# Patient Record
Sex: FEMALE | Race: OTHER | HISPANIC OR LATINO | RURAL
[De-identification: names, ages, dates, MRNs, and addresses within clinical notes are randomized per-mention and may not be internally consistent; named-entity substitution may affect disease eponyms.]

---

## 2024-03-18 ENCOUNTER — OFFICE VISIT (OUTPATIENT)
Dept: FAMILY MEDICINE | Facility: CLINIC | Age: 44
End: 2024-03-18

## 2024-03-18 VITALS
BODY MASS INDEX: 26.81 KG/M2 | SYSTOLIC BLOOD PRESSURE: 124 MMHG | TEMPERATURE: 99 F | WEIGHT: 142 LBS | HEIGHT: 61 IN | OXYGEN SATURATION: 100 % | DIASTOLIC BLOOD PRESSURE: 76 MMHG

## 2024-03-18 DIAGNOSIS — M25.511 ACUTE PAIN OF RIGHT SHOULDER: Primary | ICD-10-CM

## 2024-03-18 PROCEDURE — 96372 THER/PROPH/DIAG INJ SC/IM: CPT | Mod: ,,, | Performed by: NURSE PRACTITIONER

## 2024-03-18 PROCEDURE — 99213 OFFICE O/P EST LOW 20 MIN: CPT | Mod: 25,,, | Performed by: NURSE PRACTITIONER

## 2024-03-18 RX ORDER — KETOROLAC TROMETHAMINE 30 MG/ML
30 INJECTION, SOLUTION INTRAMUSCULAR; INTRAVENOUS
Status: COMPLETED | OUTPATIENT
Start: 2024-03-18 | End: 2024-03-18

## 2024-03-18 RX ORDER — DEXAMETHASONE SODIUM PHOSPHATE 4 MG/ML
4 INJECTION, SOLUTION INTRA-ARTICULAR; INTRALESIONAL; INTRAMUSCULAR; INTRAVENOUS; SOFT TISSUE
Status: COMPLETED | OUTPATIENT
Start: 2024-03-18 | End: 2024-03-18

## 2024-03-18 RX ADMIN — DEXAMETHASONE SODIUM PHOSPHATE 4 MG: 4 INJECTION, SOLUTION INTRA-ARTICULAR; INTRALESIONAL; INTRAMUSCULAR; INTRAVENOUS; SOFT TISSUE at 02:03

## 2024-03-18 RX ADMIN — KETOROLAC TROMETHAMINE 30 MG: 30 INJECTION, SOLUTION INTRAMUSCULAR; INTRAVENOUS at 02:03

## 2024-03-18 NOTE — PROGRESS NOTES
"Subjective:       Patient ID: Carrol Javed is a 43 y.o. female.    Chief Complaint: Shoulder Pain (Pt Complains of R Shoulder pain. Pt went to [facility] last night for pain and she received Ibprofen 800 mg and has not helped.)    Presents to clinic as above. No injury. States no xray done at [facility]. Pain worse with ROM. Can feel fingers well.       Review of Systems   Constitutional: Negative.    Respiratory: Negative.     Cardiovascular: Negative.    Musculoskeletal:  Positive for joint pain and myalgias.          Reviewed family, medical, surgical, and social history.  Dictation #1  MRN:[id number]  CSN:[id number]   Objective:      /76 (BP Location: Left arm, Patient Position: Sitting)   Temp 98.6 °F (37 °C)   Ht 5' 1" (1.549 m)   Wt 64.4 kg (142 lb)   SpO2 100%   BMI 26.83 kg/m²   Physical Exam  Vitals and nursing note reviewed.   Constitutional:       General: She is not in acute distress.     Appearance: Normal appearance. She is not ill-appearing, toxic-appearing or diaphoretic.   HENT:      Head: Normocephalic.      Mouth/Throat:      Mouth: Mucous membranes are moist.   Cardiovascular:      Rate and Rhythm: Normal rate and regular rhythm.      Heart sounds: Normal heart sounds.   Pulmonary:      Effort: Pulmonary effort is normal.      Breath sounds: Normal breath sounds.   Musculoskeletal:      Right shoulder: Bony tenderness present. No swelling, deformity, effusion, laceration, tenderness or crepitus. Decreased range of motion. Normal strength. Normal pulse.      Cervical back: Normal range of motion and neck supple.      Comments: No redness/swelling/bruising to right shoulder. Pain with ROM. But does have full ROM.   Skin:     General: Skin is warm and dry.      Capillary Refill: Capillary refill takes less than 2 seconds.   Neurological:      Mental Status: She is alert and oriented to person, place, and time.   Psychiatric:         Mood and Affect: Mood normal.         Behavior: " Behavior normal.         Thought Content: Thought content normal.         Judgment: Judgment normal.            No visits with results within 1 Day(s) from this visit.   Latest known visit with results is:   Office Visit on 03/15/2023   Component Date Value Ref Range Status    POC Glucose 03/15/2023 83  70 - 110 MG/DL Final    Color, UA 03/15/2023 Yellow   Final    Spec Grav UA 03/15/2023 1.010   Final    pH, UA 03/15/2023 5.5   Final    WBC, UA 03/15/2023 Negative   Final    Nitrite, UA 03/15/2023 Negative   Final    Protein, POC 03/15/2023 Negative   Final    Glucose, UA 03/15/2023 Negative   Final    Ketones, UA 03/15/2023 Negative   Final    Bilirubin, POC 03/15/2023 Negative   Final    Urobilinogen, UA 03/15/2023 0.2   Final    Blood, UA 03/15/2023 Trace   Final    Sodium 03/15/2023 139  136 - 145 mmol/L Final    Potassium 03/15/2023 4.7  3.5 - 5.1 mmol/L Final    Chloride 03/15/2023 110 (H)  98 - 107 mmol/L Final    CO2 03/15/2023 26  21 - 32 mmol/L Final    Anion Gap 03/15/2023 8  7 - 16 mmol/L Final    Glucose 03/15/2023 76  74 - 106 mg/dL Final    BUN 03/15/2023 18  7 - 18 mg/dL Final    Creatinine 03/15/2023 0.64  0.55 - 1.02 mg/dL Final    BUN/Creatinine Ratio 03/15/2023 28 (H)  6 - 20 Final    Calcium 03/15/2023 8.9  8.5 - 10.1 mg/dL Final    Total Protein 03/15/2023 7.2  6.4 - 8.2 g/dL Final    Albumin 03/15/2023 3.9  3.5 - 5.0 g/dL Final    Globulin 03/15/2023 3.3  2.0 - 4.0 g/dL Final    A/G Ratio 03/15/2023 1.2   Final    Bilirubin, Total 03/15/2023 0.3  >0.0 - 1.2 mg/dL Final    Alk Phos 03/15/2023 73  37 - 98 U/L Final    ALT 03/15/2023 23  13 - 56 U/L Final    AST 03/15/2023 14 (L)  15 - 37 U/L Final    eGFR 03/15/2023 115  >=60 mL/min/1.73m² Final    TSH 03/15/2023 1.290  0.358 - 3.740 uIU/mL Final    WBC 03/15/2023 7.55  4.50 - 11.00 K/uL Final    RBC 03/15/2023 4.85  4.20 - 5.40 M/uL Final    Hemoglobin 03/15/2023 9.6 (L)  12.0 - 16.0 g/dL Final    Hematocrit 03/15/2023 33.7 (L)  38.0 - 47.0 %  Final    MCV 03/15/2023 69.5 (L)  80.0 - 96.0 fL Final    MCH 03/15/2023 19.8 (L)  27.0 - 31.0 pg Final    MCHC 03/15/2023 28.5 (L)  32.0 - 36.0 g/dL Final    RDW 03/15/2023 21.5 (H)  11.5 - 14.5 % Final    Platelet Count 03/15/2023 567 (H)  150 - 400 K/uL Final    MPV 03/15/2023 9.7  9.4 - 12.4 fL Final    Neutrophils % 03/15/2023 52.1 (L)  53.0 - 65.0 % Final    Lymphocytes % 03/15/2023 36.2  27.0 - 41.0 % Final    Monocytes % 03/15/2023 7.7 (H)  2.0 - 6.0 % Final    Eosinophils % 03/15/2023 2.8  1.0 - 4.0 % Final    Basophils % 03/15/2023 1.1 (H)  0.0 - 1.0 % Final    Immature Granulocytes % 03/15/2023 0.1  0.0 - 0.4 % Final    nRBC, Auto 03/15/2023 0.0  <=0.0 % Final    Neutrophils, Abs 03/15/2023 3.94  1.80 - 7.70 K/uL Final    Lymphocytes, Absolute 03/15/2023 2.73  1.00 - 4.80 K/uL Final    Monocytes, Absolute 03/15/2023 0.58  0.00 - 0.80 K/uL Final    Eosinophils, Absolute 03/15/2023 0.21  0.00 - 0.50 K/uL Final    Basophils, Absolute 03/15/2023 0.08  0.00 - 0.20 K/uL Final    Immature Granulocytes, Absolute 03/15/2023 0.01  0.00 - 0.04 K/uL Final    nRBC, Absolute 03/15/2023 0.00  <=0.00 x10e3/uL Final    Diff Type 03/15/2023 Scan Smear   Final    Platelet Morphology 03/15/2023 Increased (A)  Normal Final    Anisocytosis 03/15/2023 2+   Final    Microcytosis 03/15/2023 1+   Final    Target Cells 03/15/2023 Few   Final    Ovalocytes 03/15/2023 Few   Final    Hypochromic 03/15/2023 1+   Final      Assessment:       1. Acute pain of right shoulder        Plan:       Acute pain of right shoulder  -     X-ray Shoulder 2 or More Views Right; Future; Expected date: 03/18/2024    I gave you a shot for inflammation and a steroid shot. If your pain continues, Notify me and I will refer to an orthopedic specialist.   I will call with xray results.   Return to clinic as needed.           Risks, benefits, and side effects were discussed with the patient. All questions were answered to the fullest satisfaction of the  patient, and pt verbalized understanding and agreement to treatment plan. Pt was to call with any new or worsening symptoms, or present to the ER.

## 2024-03-18 NOTE — PATIENT INSTRUCTIONS
I gave you a shot for inflammation and a steroid shot. If your pain continues, Notify me and I will refer to an orthopedic specialist.   I will call with xray results.   Return to clinic as needed.

## 2024-05-06 DIAGNOSIS — R04.0 EPISTAXIS: Primary | ICD-10-CM
